# Patient Record
Sex: MALE | Race: BLACK OR AFRICAN AMERICAN | NOT HISPANIC OR LATINO | URBAN - METROPOLITAN AREA
[De-identification: names, ages, dates, MRNs, and addresses within clinical notes are randomized per-mention and may not be internally consistent; named-entity substitution may affect disease eponyms.]

---

## 2017-08-10 ENCOUNTER — IMPORTED ENCOUNTER (OUTPATIENT)
Dept: URBAN - METROPOLITAN AREA CLINIC 9 | Facility: CLINIC | Age: 67
End: 2017-08-10

## 2018-02-12 ENCOUNTER — IMPORTED ENCOUNTER (OUTPATIENT)
Dept: URBAN - METROPOLITAN AREA CLINIC 9 | Facility: CLINIC | Age: 68
End: 2018-02-12

## 2018-03-09 NOTE — PATIENT DISCUSSION
- Start Lotemax BID OU (only OS for now since he will be using Maxitrol OD following chalazion I&amp;D)

## 2018-03-09 NOTE — PATIENT DISCUSSION
- Discussed the r/b/a of incision and drainage; patient and his father appear to understand and wish to proceed.

## 2018-03-09 NOTE — PATIENT DISCUSSION
New Prescription: Maxitrol (neomycin-polymyxin-dexameth): ointment: 3.5-10,000-0.1 mg-unit/g-% a small amount three times a day as directed into affected eye 03-

## 2018-07-18 ENCOUNTER — IMPORTED ENCOUNTER (OUTPATIENT)
Dept: URBAN - METROPOLITAN AREA CLINIC 9 | Facility: CLINIC | Age: 68
End: 2018-07-18

## 2019-01-14 ENCOUNTER — IMPORTED ENCOUNTER (OUTPATIENT)
Dept: URBAN - METROPOLITAN AREA CLINIC 9 | Facility: CLINIC | Age: 69
End: 2019-01-14

## 2019-05-14 NOTE — PATIENT DISCUSSION
Recommend Mini lower lift, +/- platysmaplasty, pre/post-tragel, SMAS to chin(discussed risks and benefits of sx. .. ).

## 2019-07-17 ENCOUNTER — IMPORTED ENCOUNTER (OUTPATIENT)
Dept: URBAN - METROPOLITAN AREA CLINIC 9 | Facility: CLINIC | Age: 69
End: 2019-07-17

## 2020-01-13 ENCOUNTER — IMPORTED ENCOUNTER (OUTPATIENT)
Dept: URBAN - METROPOLITAN AREA CLINIC 9 | Facility: CLINIC | Age: 70
End: 2020-01-13

## 2020-01-28 ENCOUNTER — IMPORTED ENCOUNTER (OUTPATIENT)
Dept: URBAN - METROPOLITAN AREA CLINIC 9 | Facility: CLINIC | Age: 70
End: 2020-01-28

## 2020-05-11 NOTE — PROCEDURE NOTE: SURGICAL
<strong>MR #:</strong>&nbsp; *[id number]<br /><br /><strong>PREOPERATIVE DIAGNOSIS: &nbsp; &nbsp; </strong>1. Dermatochalasis upper lids; 2. Lower lid fat herniation and excess skin 3. Right upper lid benign lesion. <br /><br /><strong>POSTOPERATIVE DIAGNOSIS: &nbsp; </strong>Same <br /><br /><strong>PROCEDURE: &nbsp; </strong>1. Upper lid blepharoplasty both eyes; 2. C02 Laser transconjunctival blepharoplasty lower lids; 3. Skin resurfacing of lower lids. 4. Right upper lid benign lesion. <br /><br /><strong>ANESTHESIA: &nbsp; &nbsp; </strong>Local MAC<br /><br /><strong>ESTIMATED BLOOD LOSS: &nbsp; </strong>&nbsp;Minimal, &lt;5 cc <br /><br /><strong>COMPLICATIONS: &nbsp; </strong>&nbsp;None<br /><br /><strong>INDICATION: &nbsp;</strong>This patient had complaints of heavy skin and muscle of the upper eyelids that comes down over the eyelids and affects vision. &nbsp; Examination complete with a photograph of the patient confirmed extra upper lid skin that hangs over the eye and blocks vision. Superior visual field defects were also documented on Suggs visual field with marked improvement in the superior scotomas after taping the lids up. We have discussed that a standard blepharoplasty operation would remove this extra tissue from the upper lids and allow an improvement in their abnormal visual field. Patient also has benign lesion on the right upper lid which will be excised and discarded. Patient understands the risks and benefits, including the risk of ectropion and scleral show and wishes to proceed. This patient also has large herniated fat on the lower lids and redundant skin of the lower lids. We decided to perform a lower blepharoplasty operation to remove lower lid fat bags and to lightly resurface the skin to tighten them up. Patient understands the risks and benefits of the surgery and wishes to proceed. <br /><br /><strong>PROCEDURE: &nbsp;</strong>The patient was brought to the operating room and laid in the supine position. Prior to surgery, a time-out was performed in the operating room, and the nurse confirmed the patient&rsquo;s identity and name, the side and site of the surgery and the type of surgery and procedure to be performed. &nbsp; I proceeded with the marking of the patient with a marking pen, in the areas of surgery to be performed. The patient had upper lid crease markings drawn out with a marking pen. The skin of the upper lids was then tentatively bunched with two forceps to determine the amount of skin to be removed so the patient could still close their eye and not have lagophthalmos. The superior portion of the blepharoplasty incision line was then marked in an eclipse with a marking pen. The patient was then sedated and injected with Xylocaine 2% with epinephrine. &nbsp; Approximately 6cc&rsquo;s were used for both lids. &nbsp; A drop of Alcaine was placed over both eyes. The patient was then prepped and draped in typical fashion for facial plastic surgery. Laser safe protective corneal lenses were then placed over both eyes. Two forceps were then used to recheck the upper lid measurements and the patient was remarked with a blue marking pen in a spindle shape fashion on each upper lid. A 15 blade was first used to make an incision through each pre-marked area on each upper lid. &nbsp; A Bovie cautery on cutting mode with a [place] tip was then used to remove a skin muscle flap on each upper lid. &nbsp; The orbital septum was opened and the orbital fat selectively removed with the Bovie Cautery from each upper lid. Meticulous hemostasis was achieved. After cauterizing all bleeders, each wound was then closed with running 6-0 plain sutures. &nbsp; The patient was inspected for contour and shape. An excellent appearance was noted. Then proceeded to the right upper lid lesion. &nbsp; The lesion was now injected with Xylocaine 2% with epinephrine. This was excised with Ness scissor, the discarded. &nbsp; All bleeders were cauterized. <br /><br />Attention was turned to the lower blepharoplasty operation where a rake was then inserted into the right lower lid, retracting it downward. A transconjunctival incision was made 2mm below the tarsus with the Bovie cautery. This incision was carried across the extent of the inner lid. Dissection was carried down with the Bovie cautery while pulling upward on the conjunctiva and retractor layer with a forceps. The fat was released from the septum and allowed to herniate forward exposing the nasal, central and temporal fat pads. Care was taken to avoid the inferior oblique muscle. These pads were teased forward and isolated. The fat was removed selectively with a Bovie cautery with excellent hemostasis. &nbsp; After judging adequate fat removal, the left lower lid had similar procedure. The skin was then resurfaced using the resurfacing CO2 laser on a low setting of 16 nunez scan mode for an initial pass on both lower lids. This was done to tighten up the skin to remove wrinkles. This patient did have lid laxity so the laser was not aggressively performed on this patient in order to avoid ectropion. A second pass on the malar area was performed with the C02 laser set again at 16 nunez. This was done to tighten up the malar area so fluid bags would not collect on the cheeks of this patient. At the end of this procedure the laser lens were removed. The patient was cleaned; antibiotic salve was placed on the wounds. The patient was sent to recovery room in stable condition. <br />

## 2020-05-18 NOTE — PATIENT DISCUSSION
Recommend 2cc's of [employer] ultra plus (discussed risks and benefits. .. ).

## 2020-05-22 ENCOUNTER — IMPORTED ENCOUNTER (OUTPATIENT)
Dept: URBAN - METROPOLITAN AREA CLINIC 9 | Facility: CLINIC | Age: 70
End: 2020-05-22

## 2020-06-03 ENCOUNTER — IMPORTED ENCOUNTER (OUTPATIENT)
Dept: URBAN - METROPOLITAN AREA CLINIC 9 | Facility: CLINIC | Age: 70
End: 2020-06-03

## 2020-06-04 NOTE — PATIENT DISCUSSION
3 week post op: lids in good position, no infection, healing well. Wear sunglasses and hat while outdoors. Avoid sun exposure. No restrictions in 5 days.

## 2020-06-04 NOTE — PATIENT DISCUSSION
3 week post op: great curve and shape, no infection, healing well.  Wear sunglasses and hat while outdoors. Avoid sun exposure. No restrictions in 5 days.

## 2020-06-04 NOTE — PATIENT DISCUSSION
Recommend Mini lower lift, No platysmaplasty, post-tragel, SMAS to Chin/ Cheeks (discussed risks and benefits of sx. .. ).

## 2020-06-17 ENCOUNTER — IMPORTED ENCOUNTER (OUTPATIENT)
Dept: URBAN - METROPOLITAN AREA CLINIC 9 | Facility: CLINIC | Age: 70
End: 2020-06-17

## 2020-07-06 ENCOUNTER — IMPORTED ENCOUNTER (OUTPATIENT)
Dept: URBAN - METROPOLITAN AREA CLINIC 9 | Facility: CLINIC | Age: 70
End: 2020-07-06

## 2021-02-23 NOTE — PATIENT DISCUSSION
Recommend 25 units of Botox. Patient elects Botox injection. 75units discarded, 25 units used. (discussed risks and benefits of BOTOX. ..). lot c [id number], exp 10/23.

## 2021-07-14 ENCOUNTER — IMPORTED ENCOUNTER (OUTPATIENT)
Dept: URBAN - METROPOLITAN AREA CLINIC 9 | Facility: CLINIC | Age: 71
End: 2021-07-14

## 2021-09-10 ENCOUNTER — IMPORTED ENCOUNTER (OUTPATIENT)
Dept: URBAN - METROPOLITAN AREA CLINIC 9 | Facility: CLINIC | Age: 71
End: 2021-09-10

## 2021-09-27 ENCOUNTER — IMPORTED ENCOUNTER (OUTPATIENT)
Dept: URBAN - METROPOLITAN AREA CLINIC 9 | Facility: CLINIC | Age: 71
End: 2021-09-27

## 2021-09-27 ENCOUNTER — PREPPED CHART (OUTPATIENT)
Dept: URBAN - METROPOLITAN AREA CLINIC 4 | Facility: CLINIC | Age: 71
End: 2021-09-27

## 2021-10-17 ASSESSMENT — TONOMETRY
OS_IOP_MMHG: 15
OS_IOP_MMHG: 14
OD_IOP_MMHG: 15
OS_IOP_MMHG: 16
OD_IOP_MMHG: 15
OD_IOP_MMHG: 16
OD_IOP_MMHG: 14
OD_IOP_MMHG: 15
OD_IOP_MMHG: 14
OS_IOP_MMHG: 18
OS_IOP_MMHG: 16
OS_IOP_MMHG: 16
OS_IOP_MMHG: 17
OD_IOP_MMHG: 17
OS_IOP_MMHG: 16
OS_IOP_MMHG: 14
OS_IOP_MMHG: 17
OD_IOP_MMHG: 14
OD_IOP_MMHG: 13
OD_IOP_MMHG: 17

## 2021-10-17 ASSESSMENT — VISUAL ACUITY
OD_CC: 20/30 -2 SN
OS_CC: 20/30 SN
OS_CC: 20/25 -2 SN
OD_CC: 20/20 - SN
OD_CC: 20/20 -2 SN
OD_CC: 20/30 - SN
OD_CC: 20/30 SN
OS_CC: 20/25 + SN
OD_CC: 20/20 SN
OD_CC: 20/40 -2 SN
OS_CC: 20/30 + SN
OS_CC: 20/20 SN
OD_CC: 20/25 SN
OS_CC: 20/30 SN
OS_CC: 20/25 + SN
OS_CC: 20/30 + SN
OS_CC: 20/25 -2 SN
OD_SC: CF 2FT SN
OD_CC: 20/25 - SN
OD_CC: 20/50 SN
OS_CC: 20/25 SN
OD_CC: 20/25 - SN
OD_CC: 20/30 - SN
OS_CC: 20/25 SN
OS_SC: CF 2FT SN

## 2021-10-17 ASSESSMENT — KERATOMETRY
OS_K2POWER_DIOPTERS: 45.25
OS_K1POWER_DIOPTERS: 44.5
OD_K1POWER_DIOPTERS: 42.25
OS_AXISANGLE_DEGREES: 157
OS_AXISANGLE2_DEGREES: 67
OD_K2POWER_DIOPTERS: 43
OD_AXISANGLE2_DEGREES: 3
OD_AXISANGLE_DEGREES: 93

## 2021-10-21 ENCOUNTER — ESTABLISHED PATIENT (OUTPATIENT)
Dept: URBAN - METROPOLITAN AREA CLINIC 4 | Facility: CLINIC | Age: 71
End: 2021-10-21

## 2021-10-21 DIAGNOSIS — H04.123: ICD-10-CM

## 2021-10-21 PROCEDURE — 99213 OFFICE O/P EST LOW 20 MIN: CPT

## 2021-10-21 ASSESSMENT — VISUAL ACUITY
OD_GLARE: 20/40
OS_GLARE: 20/50
OD_CC: 20/25
OS_CC: 20/20-1

## 2021-10-21 ASSESSMENT — TONOMETRY
OD_IOP_MMHG: 12
OS_IOP_MMHG: 15

## 2022-02-09 ENCOUNTER — ESTABLISHED PATIENT (OUTPATIENT)
Dept: URBAN - METROPOLITAN AREA CLINIC 4 | Facility: CLINIC | Age: 72
End: 2022-02-09

## 2022-02-09 DIAGNOSIS — H25.13: ICD-10-CM

## 2022-02-09 DIAGNOSIS — H04.123: ICD-10-CM

## 2022-02-09 PROCEDURE — 99213 OFFICE O/P EST LOW 20 MIN: CPT

## 2022-06-15 ENCOUNTER — ESTABLISHED PATIENT (OUTPATIENT)
Dept: URBAN - METROPOLITAN AREA CLINIC 4 | Facility: CLINIC | Age: 72
End: 2022-06-15

## 2022-06-15 DIAGNOSIS — H04.123: ICD-10-CM

## 2022-06-15 DIAGNOSIS — H25.13: ICD-10-CM

## 2022-06-15 DIAGNOSIS — H43.813: ICD-10-CM

## 2022-06-15 DIAGNOSIS — H40.013: ICD-10-CM

## 2022-06-15 PROCEDURE — 92020 GONIOSCOPY: CPT

## 2022-06-15 PROCEDURE — 92134 CPTRZ OPH DX IMG PST SGM RTA: CPT

## 2022-06-15 PROCEDURE — 99214 OFFICE O/P EST MOD 30 MIN: CPT

## 2022-06-15 PROCEDURE — 92250 FUNDUS PHOTOGRAPHY W/I&R: CPT

## 2022-06-15 ASSESSMENT — VISUAL ACUITY
OD_CC: 20/40
OS_CC: 20/40
OU_CC: 20/25-1

## 2022-06-15 ASSESSMENT — TONOMETRY
OD_IOP_MMHG: 15
OS_IOP_MMHG: 14

## 2022-06-21 NOTE — PATIENT DISCUSSION
Attempted call and left a message reg results. Informed wife to convey to listen to voicemail. Results reviewed. No changes.    Maulik's superficial punctate keratitis OU

## 2022-06-27 ENCOUNTER — TECH ONLY (OUTPATIENT)
Dept: URBAN - METROPOLITAN AREA CLINIC 4 | Facility: CLINIC | Age: 72
End: 2022-06-27

## 2022-06-27 DIAGNOSIS — H40.013: ICD-10-CM

## 2022-06-27 PROCEDURE — 92083 EXTENDED VISUAL FIELD XM: CPT

## 2022-07-06 ENCOUNTER — PRE-OP/H&P (OUTPATIENT)
Dept: URBAN - METROPOLITAN AREA CLINIC 4 | Facility: CLINIC | Age: 72
End: 2022-07-06

## 2022-07-06 DIAGNOSIS — H25.13: ICD-10-CM

## 2022-07-06 PROCEDURE — 92136 OPHTHALMIC BIOMETRY: CPT

## 2022-07-06 ASSESSMENT — VISUAL ACUITY
OS_CC: 20/40
OS_SC: 20/200
OD_CC: 20/40
OD_SC: 20/200
OD_GLARE: 20/400

## 2022-07-27 ENCOUNTER — ESTABLISHED PATIENT (OUTPATIENT)
Dept: URBAN - METROPOLITAN AREA CLINIC 4 | Facility: CLINIC | Age: 72
End: 2022-07-27

## 2022-07-27 ASSESSMENT — VISUAL ACUITY
OD_CC: 20/80
OS_CC: 20/70

## 2022-07-27 ASSESSMENT — TONOMETRY
OD_IOP_MMHG: 14
OS_IOP_MMHG: 14

## 2022-08-29 ENCOUNTER — ESTABLISHED PATIENT (OUTPATIENT)
Dept: URBAN - METROPOLITAN AREA SURGERY 6 | Facility: SURGERY | Age: 72
End: 2022-08-29

## 2022-08-29 DIAGNOSIS — Z96.1: ICD-10-CM

## 2022-08-29 DIAGNOSIS — H43.813: ICD-10-CM

## 2022-08-29 PROCEDURE — 99024 POSTOP FOLLOW-UP VISIT: CPT

## 2022-08-29 ASSESSMENT — TONOMETRY: OS_IOP_MMHG: 20

## 2022-08-29 ASSESSMENT — VISUAL ACUITY
OS_SC: 20/100
OS_SC: J1+

## 2022-09-07 ENCOUNTER — FOLLOW UP (OUTPATIENT)
Dept: URBAN - METROPOLITAN AREA CLINIC 4 | Facility: CLINIC | Age: 72
End: 2022-09-07

## 2022-09-07 DIAGNOSIS — H43.813: ICD-10-CM

## 2022-09-07 DIAGNOSIS — H25.11: ICD-10-CM

## 2022-09-07 PROCEDURE — 92136 OPHTHALMIC BIOMETRY: CPT

## 2022-09-07 PROCEDURE — 99024 POSTOP FOLLOW-UP VISIT: CPT

## 2022-09-07 ASSESSMENT — VISUAL ACUITY
OD_SC: 20/200
OU_SC: 20/30
OU_SC: J2
OS_SC: 20/30

## 2022-09-07 ASSESSMENT — TONOMETRY
OS_IOP_MMHG: 18
OD_IOP_MMHG: 16

## 2022-09-12 ENCOUNTER — POST-OP (OUTPATIENT)
Dept: URBAN - METROPOLITAN AREA CLINIC 4 | Facility: CLINIC | Age: 72
End: 2022-09-12

## 2022-09-12 DIAGNOSIS — Z96.1: ICD-10-CM

## 2022-09-12 PROCEDURE — 99024 POSTOP FOLLOW-UP VISIT: CPT

## 2022-09-12 ASSESSMENT — VISUAL ACUITY
OD_SC: 20/50
OS_SC: 20/30

## 2022-09-12 ASSESSMENT — TONOMETRY: OD_IOP_MMHG: 27

## 2022-09-26 ENCOUNTER — POST-OP (OUTPATIENT)
Dept: URBAN - METROPOLITAN AREA CLINIC 4 | Facility: CLINIC | Age: 72
End: 2022-09-26

## 2022-09-26 DIAGNOSIS — Z96.1: ICD-10-CM

## 2022-09-26 PROCEDURE — 99024 POSTOP FOLLOW-UP VISIT: CPT

## 2022-09-26 ASSESSMENT — KERATOMETRY
OS_K1POWER_DIOPTERS: 42.75
OD_K2POWER_DIOPTERS: 73.50
OS_AXISANGLE2_DEGREES: 77
OS_AXISANGLE_DEGREES: 167
OD_K1POWER_DIOPTERS: 43.00
OS_K2POWER_DIOPTERS: 43.50
OD_AXISANGLE2_DEGREES: 97
OD_AXISANGLE_DEGREES: 7

## 2022-09-26 ASSESSMENT — TONOMETRY
OS_IOP_MMHG: 18
OD_IOP_MMHG: 18

## 2022-09-26 ASSESSMENT — VISUAL ACUITY
OD_SC: 20/25-1
OS_SC: 20/25-1

## 2022-09-28 ENCOUNTER — ESTABLISHED PATIENT (OUTPATIENT)
Dept: URBAN - METROPOLITAN AREA CLINIC 4 | Facility: CLINIC | Age: 72
End: 2022-09-28

## 2022-09-28 DIAGNOSIS — Z96.1: ICD-10-CM

## 2022-09-28 PROCEDURE — 99213 OFFICE O/P EST LOW 20 MIN: CPT

## 2022-09-28 ASSESSMENT — KERATOMETRY
OD_AXISANGLE2_DEGREES: 97
OS_AXISANGLE_DEGREES: 167
OD_K2POWER_DIOPTERS: 73.50
OS_K2POWER_DIOPTERS: 43.50
OS_K1POWER_DIOPTERS: 42.75
OD_AXISANGLE_DEGREES: 7
OD_K1POWER_DIOPTERS: 43.00
OS_AXISANGLE2_DEGREES: 77

## 2022-09-28 ASSESSMENT — TONOMETRY
OS_IOP_MMHG: 11
OD_IOP_MMHG: 15

## 2022-09-28 ASSESSMENT — VISUAL ACUITY
OS_SC: 20/40+1
OD_SC: 20/25-2

## 2022-10-27 ENCOUNTER — EMERGENCY VISIT (OUTPATIENT)
Dept: URBAN - METROPOLITAN AREA CLINIC 4 | Facility: CLINIC | Age: 72
End: 2022-10-27

## 2022-10-27 DIAGNOSIS — H43.813: ICD-10-CM

## 2022-10-27 DIAGNOSIS — Z96.1: ICD-10-CM

## 2022-10-27 DIAGNOSIS — H26.493: ICD-10-CM

## 2022-10-27 PROCEDURE — 92134 CPTRZ OPH DX IMG PST SGM RTA: CPT

## 2022-10-27 PROCEDURE — 99213 OFFICE O/P EST LOW 20 MIN: CPT

## 2022-10-27 ASSESSMENT — KERATOMETRY
OS_K1POWER_DIOPTERS: 42.75
OS_AXISANGLE_DEGREES: 158
OD_AXISANGLE2_DEGREES: 106
OD_K1POWER_DIOPTERS: 42.75
OS_K2POWER_DIOPTERS: 43.50
OS_AXISANGLE2_DEGREES: 68
OD_AXISANGLE_DEGREES: 016
OD_K2POWER_DIOPTERS: 43.25

## 2022-10-27 ASSESSMENT — TONOMETRY
OD_IOP_MMHG: 11
OS_IOP_MMHG: 15

## 2022-11-01 ASSESSMENT — VISUAL ACUITY
OD_SC: 20/40
OS_SC: 20/30-2

## 2023-04-19 ENCOUNTER — ESTABLISHED PATIENT (OUTPATIENT)
Dept: URBAN - METROPOLITAN AREA CLINIC 4 | Facility: CLINIC | Age: 73
End: 2023-04-19

## 2023-04-19 DIAGNOSIS — H11.32: ICD-10-CM

## 2023-04-19 DIAGNOSIS — H40.013: ICD-10-CM

## 2023-04-19 PROCEDURE — 99213 OFFICE O/P EST LOW 20 MIN: CPT

## 2023-04-19 ASSESSMENT — VISUAL ACUITY
OS_SC: 20/20-1
OD_SC: 20/25-2

## 2023-04-19 ASSESSMENT — KERATOMETRY
OS_AXISANGLE_DEGREES: 158
OS_K2POWER_DIOPTERS: 43.50
OD_K2POWER_DIOPTERS: 43.25
OS_K1POWER_DIOPTERS: 42.75
OD_AXISANGLE_DEGREES: 016
OD_AXISANGLE2_DEGREES: 106
OD_K1POWER_DIOPTERS: 42.75
OS_AXISANGLE2_DEGREES: 68

## 2023-04-19 ASSESSMENT — TONOMETRY
OD_IOP_MMHG: 11
OS_IOP_MMHG: 10

## 2023-06-07 ENCOUNTER — ESTABLISHED PATIENT (OUTPATIENT)
Dept: URBAN - METROPOLITAN AREA CLINIC 4 | Facility: CLINIC | Age: 73
End: 2023-06-07

## 2023-06-07 DIAGNOSIS — H40.013: ICD-10-CM

## 2023-06-07 PROCEDURE — 92083 EXTENDED VISUAL FIELD XM: CPT

## 2023-06-07 ASSESSMENT — KERATOMETRY
OS_K2POWER_DIOPTERS: 43.50
OS_K1POWER_DIOPTERS: 42.75
OD_K1POWER_DIOPTERS: 42.75
OD_AXISANGLE2_DEGREES: 106
OD_AXISANGLE_DEGREES: 016
OS_AXISANGLE_DEGREES: 158
OS_AXISANGLE2_DEGREES: 68
OD_K2POWER_DIOPTERS: 43.25

## 2024-04-24 ENCOUNTER — ESTABLISHED PATIENT (OUTPATIENT)
Facility: LOCATION | Age: 74
End: 2024-04-24

## 2024-04-24 DIAGNOSIS — H43.813: ICD-10-CM

## 2024-04-24 DIAGNOSIS — D31.02: ICD-10-CM

## 2024-04-24 DIAGNOSIS — H40.013: ICD-10-CM

## 2024-04-24 DIAGNOSIS — H04.123: ICD-10-CM

## 2024-04-24 PROCEDURE — 92015 DETERMINE REFRACTIVE STATE: CPT

## 2024-04-24 PROCEDURE — 92014 COMPRE OPH EXAM EST PT 1/>: CPT

## 2024-04-24 PROCEDURE — 92133 CPTRZD OPH DX IMG PST SGM ON: CPT

## 2024-04-24 ASSESSMENT — KERATOMETRY
OS_K1POWER_DIOPTERS: 42.75
OS_K2POWER_DIOPTERS: 43.50
OS_AXISANGLE2_DEGREES: 68
OD_K1POWER_DIOPTERS: 42.75
OD_K2POWER_DIOPTERS: 43.25
OD_AXISANGLE_DEGREES: 016
OS_AXISANGLE_DEGREES: 158
OD_AXISANGLE2_DEGREES: 106

## 2024-04-24 ASSESSMENT — VISUAL ACUITY
OD_SC: 20/20-2
OS_SC: 20/25-2
OU_SC: 20/20-2

## 2024-04-24 ASSESSMENT — TONOMETRY
OS_IOP_MMHG: 15
OD_IOP_MMHG: 16

## 2024-08-12 ENCOUNTER — EMERGENCY VISIT (OUTPATIENT)
Facility: LOCATION | Age: 74
End: 2024-08-12

## 2024-08-12 DIAGNOSIS — H04.123: ICD-10-CM

## 2024-08-12 DIAGNOSIS — H35.372: ICD-10-CM

## 2024-08-12 PROCEDURE — 92134 CPTRZ OPH DX IMG PST SGM RTA: CPT

## 2024-08-12 PROCEDURE — 92014 COMPRE OPH EXAM EST PT 1/>: CPT

## 2024-08-12 ASSESSMENT — VISUAL ACUITY
OU_CC: 20/25-1
OS_CC: 20/30+1
OD_CC: 20/25+1

## 2024-08-12 ASSESSMENT — TONOMETRY
OS_IOP_MMHG: 16
OD_IOP_MMHG: 12

## 2024-09-16 ENCOUNTER — COMPREHENSIVE EXAM (OUTPATIENT)
Dept: URBAN - METROPOLITAN AREA CLINIC 11 | Facility: CLINIC | Age: 74
End: 2024-09-16

## 2024-09-16 DIAGNOSIS — H35.372: ICD-10-CM

## 2024-09-16 DIAGNOSIS — H43.823: ICD-10-CM

## 2024-09-16 PROCEDURE — 92014 COMPRE OPH EXAM EST PT 1/>: CPT

## 2024-09-16 PROCEDURE — 92201 OPSCPY EXTND RTA DRAW UNI/BI: CPT | Mod: NC

## 2024-09-16 PROCEDURE — 92134 CPTRZ OPH DX IMG PST SGM RTA: CPT

## 2024-12-20 ENCOUNTER — FOLLOW UP (OUTPATIENT)
Age: 74
End: 2024-12-20

## 2024-12-20 DIAGNOSIS — H35.372: ICD-10-CM

## 2024-12-20 DIAGNOSIS — H43.823: ICD-10-CM

## 2024-12-20 DIAGNOSIS — H33.102: ICD-10-CM

## 2024-12-20 PROCEDURE — 92134 CPTRZ OPH DX IMG PST SGM RTA: CPT

## 2024-12-20 PROCEDURE — 99213 OFFICE O/P EST LOW 20 MIN: CPT

## 2025-01-23 NOTE — PATIENT DISCUSSION
Patient Transferred to: [facility]  Handoff Report Given to: Rn Recommended artificial tears to use: 1 drop 4x a day in both eyes.

## 2025-06-20 ENCOUNTER — COMPREHENSIVE EXAM (OUTPATIENT)
Age: 75
End: 2025-06-20

## 2025-06-20 DIAGNOSIS — H40.013: ICD-10-CM

## 2025-06-20 DIAGNOSIS — H33.102: ICD-10-CM

## 2025-06-20 DIAGNOSIS — H43.823: ICD-10-CM

## 2025-06-20 DIAGNOSIS — H35.372: ICD-10-CM

## 2025-06-20 PROCEDURE — 92014 COMPRE OPH EXAM EST PT 1/>: CPT

## 2025-06-20 PROCEDURE — 92134 CPTRZ OPH DX IMG PST SGM RTA: CPT
